# Patient Record
Sex: MALE | Race: OTHER | Employment: FULL TIME | URBAN - METROPOLITAN AREA
[De-identification: names, ages, dates, MRNs, and addresses within clinical notes are randomized per-mention and may not be internally consistent; named-entity substitution may affect disease eponyms.]

---

## 2022-10-20 ENCOUNTER — OFFICE VISIT (OUTPATIENT)
Dept: SURGERY | Age: 42
End: 2022-10-20
Payer: COMMERCIAL

## 2022-10-20 VITALS
SYSTOLIC BLOOD PRESSURE: 122 MMHG | DIASTOLIC BLOOD PRESSURE: 80 MMHG | BODY MASS INDEX: 26.66 KG/M2 | WEIGHT: 160 LBS | HEIGHT: 65 IN | HEART RATE: 82 BPM | OXYGEN SATURATION: 98 %

## 2022-10-20 DIAGNOSIS — L05.91 PILONIDAL CYST: ICD-10-CM

## 2022-10-20 PROCEDURE — 46600 DIAGNOSTIC ANOSCOPY SPX: CPT | Performed by: STUDENT IN AN ORGANIZED HEALTH CARE EDUCATION/TRAINING PROGRAM

## 2022-10-20 PROCEDURE — 99204 OFFICE O/P NEW MOD 45 MIN: CPT | Performed by: STUDENT IN AN ORGANIZED HEALTH CARE EDUCATION/TRAINING PROGRAM

## 2022-10-20 RX ORDER — METRONIDAZOLE 500 MG/1
TABLET ORAL
COMMUNITY
Start: 2022-10-06

## 2022-10-20 RX ORDER — DIAPER,BRIEF,INFANT-TODD,DISP
EACH MISCELLANEOUS
Qty: 30 G | Refills: 1 | Status: SHIPPED | OUTPATIENT
Start: 2022-10-20 | End: 2022-10-27

## 2022-10-20 RX ORDER — CLINDAMYCIN PHOSPHATE 10 MG/G
GEL TOPICAL
Qty: 60 G | Refills: 2 | Status: SHIPPED | OUTPATIENT
Start: 2022-10-20

## 2022-10-20 RX ORDER — LIDOCAINE 50 MG/G
OINTMENT TOPICAL
Qty: 30 G | Refills: 1 | Status: SHIPPED | OUTPATIENT
Start: 2022-10-20

## 2022-10-20 RX ORDER — CIPROFLOXACIN 500 MG/1
TABLET, FILM COATED ORAL
COMMUNITY
Start: 2022-10-06

## 2022-10-20 NOTE — PROGRESS NOTES
General Surgery New Patient Office Note:    10/20/2022    Ivet Egan  MRN: [id number]      CHIEF COMPLAINT: rectal pain     PRIMARY CARE PHYSICIAN: Satya Dash MD    HISTORY: Lobo Adkins  presents for 2-month history of rectal pain. Patient states that he went to see his primary care physician for this area of concern that was a lump. He was sent to GI and underwent a colonoscopy which was normal.  He was placed on nitrofurantoin and which she says that that has helped somewhat. He states he is having normal bowel function. But still has pain with bowel movements. He states that he had blood in his stool at the beginning but he no longer has any blood in his stool. He denies any drainage from this area. REVIEW OF SYSTEMS: 10 Point ROS negative except what is in HPI      History reviewed. No pertinent past medical history. Current Outpatient Medications   Medication Sig Dispense Refill    clindamycin (CLEOCIN-T) 1 % gel Apply topically 2 times daily. 60 g 2    lidocaine (XYLOCAINE) 5 % ointment Apply topically as needed. 30 g 1    hydrocortisone (ALA-BARBARA) 1 % cream Apply topically 2 times daily. 30 g 1    ciprofloxacin (CIPRO) 500 MG tablet       metroNIDAZOLE (FLAGYL) 500 MG tablet        No current facility-administered medications for this visit. No family history on file. Social History     Socioeconomic History    Marital status:      Spouse name: None    Number of children: None    Years of education: None    Highest education level: None   Tobacco Use    Smoking status: Never    Smokeless tobacco: Never       PHYSICAL EXAMINATION:    /80   Pulse 82   Ht 5' 5\" (1.651 m)   Wt 160 lb (72.6 kg)   SpO2 98%   BMI 26.63 kg/m²     General Appearance AOx3, in no acute distress  Eyes Conjunctivae/corneas clear. PERRL, EOM's intact. No scleral icterus  Ears, Nose, Throat ENT exam normal, no neck nodes or sinus tenderness  Neck supple, no significant adenopathy.  No notable JVD  Respiratory No chest wall deformities or tenderness, respiratory effort normal, no use of accessory muscles. Cardiovascular RRR. No chest wall tenderness. Gastrointestinal Abdomen soft, nontender, nondistended. BS x4. No rebound, guarding, or rigidity present. No palpable masses. No CVA tenderness  Lymphatics No palpable lymphadenopathy, no hepatosplenomegaly  Musculoskeletal No joint tenderness, deformity or swelling. Full ROM UE/LE. Distal pulses intact UE/LE. No edema, cyanosis, or venous stasis changes. Skin Normal coloration and turgor, no rashes, no suspicious skin lesions noted  Rectal: Chronic pilonidal cyst  Neurological alert, oriented, normal speech, no focal findings or movement disorder noted, CN II-XII intact  Psychiatric Alert and oriented, appropriate affect    STUDIES: None    IMPRESSION:  Chronic pilonidal cyst    PLAN:  I did visualize with an anoscope. I do not see any signs of anal fissures. He does have an area of chronic cyst capsule at the 9 o'clock position. Had a long discussion with the patient in regard to their chronic pilonidal cyst.  I discussed with him that it does not appear to be infected at this point in time. He still has a couple days left on his antibiotics and I have instructed him to finish this out. We discussed conservative management including a good bowel regimen with stool softeners up to 2 times daily, metamucil and a high fiber diet and then milk of mag or laxatives as needed. We also discussed sitz baths. We then talked about topical treatments including lidocaine/hydrocortisone treatment and nitro paste. Finally we discussed the most aggressive approach of surgery and what this entails. At this point in time he is can stick with conservative management. I will add on a topical Cipro to help keep any infection at [place] [id number].   He is going to see how this does over the next couple of weeks and then if it does not improve he will call me for surgery.